# Patient Record
(demographics unavailable — no encounter records)

---
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
## 2024-11-07 NOTE — DISCUSSION/SUMMARY
[de-identified] : Chief complaint: Follow-up on left knee pain  HPI: Patient is a 17-year-old female who presents the office today accompanied by her mother for the repeat evaluation of left knee pain.  Since her last visit patient reports that she has started therapy.  She has only done 1 session of therapy.  Her next/second therapy session is scheduled for tomorrow, 11/8/2024.  Patient reports ongoing improvement in her left knee pain/discomfort.  She has been gradually decreasing the use of the left knee brace.  She states that she still uses it while at school out of fear that other students will run into her.  Denies any new fall, injury, or trauma.  ROS: Positive for left knee pain  Physical examination of the left knee shows: No erythema  No ecchymosis  No tenderness to palpation  No effusion Able to perform active straight leg raise Knee flexion from 0-130 degrees Light touch is intact throughout Nonantalgic gait Negative Heladio's Negative anterior Drawer Negative patellofemoral crepitus No appreciable instability with varus / valgus stress testing Calves are soft and nontender  Assessment/plan: Left knee injury, subsequent encounter, discussed ongoing treatment options with the patient and with her mother, at this time I recommend ongoing physical therapy, I discussed doing home exercises at the direction of her physical therapist, I recommended the ongoing weaning out of the left knee brace eventually to complete discontinuation of the brace, patient should gradually get back to doing activities as tolerated, she can take over-the-counter medication on an as-needed basis for any pain, patient can follow-up with us on an as-needed basis, patient and her mother verbalized understanding of all findings in the office today, they agree to follow-up as directed

## 2024-12-16 NOTE — ASSESSMENT
[FreeTextEntry1] : 18yo female with pmh anxiety and dysmenorrhea who is here for follow up of migraines without aura. Since her last visit headaches have increased in frequency, now happening about once a week. Abortive meds not working as well either. Will optimize prevention treatment.

## 2024-12-16 NOTE — PHYSICAL EXAM
[Well-appearing] : well-appearing [Normocephalic] : normocephalic [No dysmorphic facial features] : no dysmorphic facial features [Alert] : alert [Well related, good eye contact] : well related, good eye contact [Conversant] : conversant [Normal speech and language] : normal speech and language [Follows instructions well] : follows instructions well [VFF] : VFF [Pupils reactive to light and accommodation] : pupils reactive to light and accommodation [Full extraocular movements] : full extraocular movements [No nystagmus] : no nystagmus [No papilledema] : no papilledema [Normal facial sensation to light touch] : normal facial sensation to light touch [No facial asymmetry or weakness] : no facial asymmetry or weakness [Gross hearing intact] : gross hearing intact [Equal palate elevation] : equal palate elevation [Good shoulder shrug] : good shoulder shrug [Normal tongue movement] : normal tongue movement [Gets up on table without difficulty] : gets up on table without difficulty [No pronator drift] : no pronator drift [Normal finger tapping and fine finger movements] : normal finger tapping and fine finger movements [No abnormal involuntary movements] : no abnormal involuntary movements [5/5 strength in proximal and distal muscles of arms and legs] : 5/5 strength in proximal and distal muscles of arms and legs [Walks and runs well] : walks and runs well [Able to walk on heels] : able to walk on heels [Able to walk on toes] : able to walk on toes [Localizes LT and temperature] : localizes LT and temperature [No dysmetria on FTNT] : no dysmetria on FTNT [Good walking balance] : good walking balance [Normal gait] : normal gait [Able to tandem well] : able to tandem well [Negative Romberg] : negative Romberg

## 2024-12-16 NOTE — HISTORY OF PRESENT ILLNESS
[FreeTextEntry1] : follow up 12/16/24: patient lowered her amitriptyline to 20mg over the summer and then she went back up to 40mg nightly since september 2024 she is having headaches now about once a week she is taking nurtec as needed for her headaches, has not been working consistently and has not been working as well as it used to recently she had a headache that lasted 3 days which has not happened for a long time naproxen 440mg did not work she used to be on birth control but she recently stopped  previous acute medications: sumatriptan, rizatriptan, advil, tylenol, diclofenac, flurbiprofen, nurtec (working well), celebrex previous prevention medications: topamax 25mg BID, amitriptyline, migralief   previous history: previously saw patient at [facility], spring 2022  headaches started several years ago  pain located left side of the face behind her eye, also pain in her jaw, can be on both sides  described as sharp, pressure, pulsating, throbbing  duration of headaches from one day to several days  frequency of headaches maybe 1-2 times a week, doing well on amitriptyline 40mg nightly    associated symptoms: +nausea, +photophobia/phonophobia    treated with:    aura? none    premonitory symptoms? none    other symptoms with headaches- lightheaded with the pain    positional component? do not wake her up at night    triggers: weather changes    episodic conditions and other pediatric relevant conditions?     lifestyle:  9 hours of sleep (not always regular)  not all the time breakfast  less than 8 cups daily   Headache HPI   In the context of: no head trauma, no infections, no stressors   Previous Imaging: yes. brain MRI 2016 normal. Headache Description

## 2024-12-16 NOTE — PLAN
[FreeTextEntry1] : start aimovig 70mg monthly injections continue amitriptyline 40mg nightly for now, can start to wean off after being on aimovig for about one month- decrease to 30mg nightly for 3 days then 20mg nightly for 3 days then 10mg nightly for 3 days then stop Naproxen 500mg BID as needed for headaches, do not take more than 3-4 times a week nurtec 75mg daily as needed for headaches  Lifestyle Goals:  Regular sleep/waking times (on both weekdays and weekends) - Children 3-4yo:10-13 hrs; 6-13yo: 9-12 hrs; teens 13+: 8-10 hrs  Regular exercise - 30 mins a day, 5 days a week  Regular meals (protein rich breakfast within 30 min of waking and no skipping meals)  Stay hydrated (1 ounce/kg body weight, 8-10 cups of water per day for teens)  Can refer to www.headachereliefguide.com  for more information on healthy habits

## 2025-03-18 NOTE — ASSESSMENT
[FreeTextEntry1] : 18yo female with pmh anxiety and dysmenorrhea who is here for follow up of migraines without aura. Since her last visit headaches have remained about the same frequency, she has completed now two aimovig 70mg monthly injections. Will plan to increase aimovig to 140mg monthly. Nurtec working well as an abortive.

## 2025-03-18 NOTE — HISTORY OF PRESENT ILLNESS
[FreeTextEntry1] : follow up 3/18/25: mom reports that patient has had two injections of aimovig 70mg monthly, going to get third injection this week, no side effects mom reports that she has not noticed any changes in the headache frequency patient is now amitriptyline 10mg nightly, she has been on this dose for a couple of weeks patient stopped the pill in the summer of 2024 she is having headaches now about once a week, she is taking nurtec 75mg as needed, works well but there are times that it does not work there are weeks when she has it more than once a week  previous history: follow up 12/16/24: patient lowered her amitriptyline to 20mg over the summer and then she went back up to 40mg nightly since september 2024 she is having headaches now about once a week she is taking nurtec as needed for her headaches, has not been working consistently and has not been working as well as it used to recently she had a headache that lasted 3 days which has not happened for a long time naproxen 440mg did not work she used to be on birth control but she recently stopped  previous acute medications: sumatriptan, rizatriptan, advil, tylenol, diclofenac, flurbiprofen, nurtec (working well), celebrex previous prevention medications: topamax 25mg BID, amitriptyline, migralief   previous history: previously saw patient at [facility], spring 2022  headaches started several years ago  pain located left side of the face behind her eye, also pain in her jaw, can be on both sides  described as sharp, pressure, pulsating, throbbing  duration of headaches from one day to several days  frequency of headaches maybe 1-2 times a week, doing well on amitriptyline 40mg nightly    associated symptoms: +nausea, +photophobia/phonophobia    treated with:    aura? none    premonitory symptoms? none    other symptoms with headaches- lightheaded with the pain    positional component? do not wake her up at night    triggers: weather changes    episodic conditions and other pediatric relevant conditions?     lifestyle:  9 hours of sleep (not always regular)  not all the time breakfast  less than 8 cups daily   Headache HPI In the context of: no head trauma, no infections, no stressors Previous Imaging: yes. brain MRI 2016 normal. Headache Description

## 2025-03-18 NOTE — PHYSICAL EXAM
[Well-appearing] : well-appearing [Normocephalic] : normocephalic [No dysmorphic facial features] : no dysmorphic facial features [Alert] : alert [Well related, good eye contact] : well related, good eye contact [Conversant] : conversant [Normal speech and language] : normal speech and language [Follows instructions well] : follows instructions well [Full extraocular movements] : full extraocular movements [No nystagmus] : no nystagmus [No facial asymmetry or weakness] : no facial asymmetry or weakness [Gross hearing intact] : gross hearing intact [Good shoulder shrug] : good shoulder shrug [Normal tongue movement] : normal tongue movement [Gets up on table without difficulty] : gets up on table without difficulty [No pronator drift] : no pronator drift [Normal finger tapping and fine finger movements] : normal finger tapping and fine finger movements [No abnormal involuntary movements] : no abnormal involuntary movements [Walks and runs well] : walks and runs well [Able to walk on heels] : able to walk on heels [Able to walk on toes] : able to walk on toes [No dysmetria on FTNT] : no dysmetria on FTNT [Good walking balance] : good walking balance [Normal gait] : normal gait [Able to tandem well] : able to tandem well [Negative Romberg] : negative Romberg

## 2025-03-18 NOTE — REASON FOR VISIT
[Home] : at home, [unfilled] , at the time of the visit. [Medical Office: ([facility])___] : at the medical office located in  [Telehealth (audio & video)] : This visit was provided via telehealth using real-time 2-way audio visual technology. [Follow-Up Evaluation] : a follow-up evaluation for [Headache] : headache [Mother] : mother

## 2025-03-18 NOTE — PLAN
[FreeTextEntry1] : stop amitriptyline increase aimovig to 140mg monthly next would consider rita cedenoality of botox injections Naproxen 500mg BID as needed for headaches, do not take more than 3-4 times a week nurtec 75mg daily as needed  Lifestyle Goals:  Regular sleep/waking times (on both weekdays and weekends) - Children 3-4yo:10-13 hrs; 6-11yo: 9-12 hrs; teens 13+: 8-10 hrs  Regular exercise - 30 mins a day, 5 days a week  Regular meals (protein rich breakfast within 30 min of waking and no skipping meals)  Stay hydrated (1 ounce/kg body weight, 8-10 cups of water per day for teens)  Can refer to www.headachereliefguide.com  for more information on healthy habits

## 2025-07-21 NOTE — HISTORY OF PRESENT ILLNESS
[FreeTextEntry1] : follow up 7/21/25: since last visit patient did one injection of aimovig 140mg  then switched to ajovy, only did one injection at the beginning of june, missed her injection in early july because of insurance issues she is having headaches about 3 days a week she is taking nurtec 75mg as needed, works 50 % of the time  previous history: follow up 3/18/25: mom reports that patient has had two injections of aimovig 70mg monthly, going to get third injection this week, no side effects mom reports that she has not noticed any changes in the headache frequency patient is now amitriptyline 10mg nightly, she has been on this dose for a couple of weeks patient stopped the pill in the summer of 2024 she is having headaches now about once a week, she is taking nurtec 75mg as needed, works well but there are times that it does not work there are weeks when she has it more than once a week  previous history: follow up 12/16/24: patient lowered her amitriptyline to 20mg over the summer and then she went back up to 40mg nightly since september 2024 she is having headaches now about once a week she is taking nurtec as needed for her headaches, has not been working consistently and has not been working as well as it used to recently she had a headache that lasted 3 days which has not happened for a long time naproxen 440mg did not work she used to be on birth control but she recently stopped  previous acute medications: sumatriptan, rizatriptan, advil, tylenol, diclofenac, flurbiprofen, nurtec (working well), celebrex, naproxen (doesnt work)  previous prevention medications: topamax 25mg BID, amitriptyline, migralief, aimovig 140mg   previous history: previously saw patient at [facility], spring 2022  headaches started several years ago  pain located left side of the face behind her eye, also pain in her jaw, can be on both sides  described as sharp, pressure, pulsating, throbbing  duration of headaches from one day to several days  frequency of headaches maybe 1-2 times a week, doing well on amitriptyline 40mg nightly    associated symptoms: +nausea, +photophobia/phonophobia    treated with:    aura? none    premonitory symptoms? none    other symptoms with headaches- lightheaded with the pain    positional component? do not wake her up at night    triggers: weather changes    episodic conditions and other pediatric relevant conditions?     lifestyle:  9 hours of sleep (not always regular)  not all the time breakfast  less than 8 cups daily   Headache HPI In the context of: no head trauma, no infections, no stressors Previous Imaging: yes. brain MRI 2016 normal. Headache Description

## 2025-07-21 NOTE — ASSESSMENT
[FreeTextEntry1] : 16yo female with pmh anxiety and dysmenorrhea who is here for follow up of migraines without aura. Since her last visit headaches have remained about the same frequency, she has now completed aimovig 140mg injection without improvement and started ajovy injections but only did one round, is going to do the second one this week. Will trial ajovy injections and if no improvement will recommend botox.

## 2025-07-21 NOTE — PHYSICAL EXAM
A user error has taken place: encounter opened in error, closed for administrative reasons. [Well-appearing] : well-appearing [Normocephalic] : normocephalic [No dysmorphic facial features] : no dysmorphic facial features [Alert] : alert [Well related, good eye contact] : well related, good eye contact [Conversant] : conversant [Normal speech and language] : normal speech and language [Follows instructions well] : follows instructions well [Full extraocular movements] : full extraocular movements [No nystagmus] : no nystagmus [No facial asymmetry or weakness] : no facial asymmetry or weakness [Gross hearing intact] : gross hearing intact [Good shoulder shrug] : good shoulder shrug [Normal tongue movement] : normal tongue movement [Gets up on table without difficulty] : gets up on table without difficulty [No pronator drift] : no pronator drift [Normal finger tapping and fine finger movements] : normal finger tapping and fine finger movements [No abnormal involuntary movements] : no abnormal involuntary movements [Walks and runs well] : walks and runs well [Able to walk on heels] : able to walk on heels [Able to walk on toes] : able to walk on toes [No dysmetria on FTNT] : no dysmetria on FTNT [Good walking balance] : good walking balance [Normal gait] : normal gait [Able to tandem well] : able to tandem well [Negative Romberg] : negative Romberg

## 2025-07-21 NOTE — PLAN
[FreeTextEntry1] : continue ajovy monthly injections for at least 2 more rounds if no improvement will recommend starting botox injections flurbiprofen 100mg BID as needed for headaches nurtec 75mg daily as needed for headaches  Lifestyle Goals:  Regular sleep/waking times (on both weekdays and weekends) - Children 3-6yo:10-13 hrs; 6-11yo: 9-12 hrs; teens 13+: 8-10 hrs  Regular exercise - 30 mins a day, 5 days a week  Regular meals (protein rich breakfast within 30 min of waking and no skipping meals)  Stay hydrated (1 ounce/kg body weight, 8-10 cups of water per day for teens)  Can refer to www.headachereliefguide.com  for more information on healthy habits